# Patient Record
Sex: MALE | Race: WHITE | NOT HISPANIC OR LATINO | Employment: OTHER | URBAN - METROPOLITAN AREA
[De-identification: names, ages, dates, MRNs, and addresses within clinical notes are randomized per-mention and may not be internally consistent; named-entity substitution may affect disease eponyms.]

---

## 2023-12-29 ENCOUNTER — MEDICAL CORRESPONDENCE (OUTPATIENT)
Dept: HEALTH INFORMATION MANAGEMENT | Facility: CLINIC | Age: 68
End: 2023-12-29

## 2024-01-02 ENCOUNTER — TRANSCRIBE ORDERS (OUTPATIENT)
Dept: OTHER | Age: 69
End: 2024-01-02

## 2024-01-02 DIAGNOSIS — R41.840 INATTENTION: ICD-10-CM

## 2024-01-02 DIAGNOSIS — R41.3 MEMORY DEFICITS: Primary | ICD-10-CM

## 2024-01-02 DIAGNOSIS — R45.4 IRRITABILITY AND ANGER: ICD-10-CM

## 2024-02-19 ENCOUNTER — TRANSFERRED RECORDS (OUTPATIENT)
Dept: HEALTH INFORMATION MANAGEMENT | Facility: CLINIC | Age: 69
End: 2024-02-19
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
## 2024-03-10 ENCOUNTER — HEALTH MAINTENANCE LETTER (OUTPATIENT)
Age: 69
End: 2024-03-10

## 2024-03-11 ENCOUNTER — OFFICE VISIT (OUTPATIENT)
Dept: NEUROPSYCHOLOGY | Facility: CLINIC | Age: 69
End: 2024-03-11
Attending: PSYCHOLOGIST
Payer: COMMERCIAL

## 2024-03-11 DIAGNOSIS — F41.9 ANXIETY: Primary | ICD-10-CM

## 2024-03-11 DIAGNOSIS — R41.3 MEMORY DEFICITS: ICD-10-CM

## 2024-03-11 DIAGNOSIS — F32.0 CURRENT MILD EPISODE OF MAJOR DEPRESSIVE DISORDER, UNSPECIFIED WHETHER RECURRENT (H): ICD-10-CM

## 2024-03-11 PROCEDURE — 96138 PSYCL/NRPSYC TECH 1ST: CPT | Performed by: PSYCHOLOGIST

## 2024-03-11 PROCEDURE — 90791 PSYCH DIAGNOSTIC EVALUATION: CPT | Performed by: PSYCHOLOGIST

## 2024-03-11 PROCEDURE — 96133 NRPSYC TST EVAL PHYS/QHP EA: CPT | Performed by: PSYCHOLOGIST

## 2024-03-11 PROCEDURE — 96139 PSYCL/NRPSYC TST TECH EA: CPT | Performed by: PSYCHOLOGIST

## 2024-03-11 PROCEDURE — 96132 NRPSYC TST EVAL PHYS/QHP 1ST: CPT | Performed by: PSYCHOLOGIST
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
## 2024-03-12 NOTE — NURSING NOTE
Pt was seen for neuropsychological evaluation at the request of Dr. Donnie Guerrero for the purposes of diagnostic clarification and treatment planning. 150 minutes of test administration and scoring were provided by this writer. Please see Dr. Iftikhar Luna's report for a full interpretation of the findings.    Humphrey Nevarez MA, CSP

## 2024-03-12 NOTE — PROGRESS NOTES
NAME  Ebenezer Ambrocio (Bill)    MRN  [id number]      [birth year]     AGE  68     SEX  Male     HANDEDNESS Right     EDUCATION 11     KELLY  3/11/24     PROVIDER  CJ     [employer]       STATION  OP            ORIENTATION      Time  -1     Personal Info.     Place  1.5     Presidents             TOMM       Trial 1  50            WAIS-IV       Digit Span RDS 8            WRAT READING   5   SS  108     %ile  70     Grade Equivalence >12.9            WAIS-IV         Raw ScS    Digit Span  24 9    Similarities 27 11           TRAIL MAKING TEST       Time Errors ScS T   A 28 0 10 60   B 57 0 11 65           RBANS   A     Raw ScS/%ile    List Learning 24 7    Story Memory 14 6    Figure Copy 18 10    Line Orientation 19 >75    Picture Naming 10 51-75    Semantic Fluency 13 4    Digit Span  10 10    Coding  48 10    List Recall  1 3-9    List Recognition 18 10-16    Story Recall 7 8    Figure Recall 17 13             Index     Immediate Mem. 81     Visuospat./Constr. 109     Language  85     Attention  100     Delayed Mem. 84     Total Scale 88            COWAT FAS      Raw 23      ScS 6      T 39             CLOCK DRAWING      Command  NORMAL     Copy  NORMAL            STROOP        Raw T     Word 91 54     Color 38 35     C/W 29 48     Intf. 2 60            GDS       Raw  10     Interpretation MILD/MODERATE           GAS        Raw Interpretation    Somatic 9 Mild     Cognitive 11 Severe     Affective 11 Severe     Total 31 Severe

## 2024-03-13 ENCOUNTER — TRANSFERRED RECORDS (OUTPATIENT)
Dept: HEALTH INFORMATION MANAGEMENT | Facility: CLINIC | Age: 69
End: 2024-03-13

## 2024-03-15 DIAGNOSIS — G47.33 OSA (OBSTRUCTIVE SLEEP APNEA): Primary | ICD-10-CM

## 2024-03-22 ENCOUNTER — TELEPHONE (OUTPATIENT)
Dept: FAMILY MEDICINE | Facility: CLINIC | Age: 69
End: 2024-03-22

## 2024-03-22 ENCOUNTER — OFFICE VISIT (OUTPATIENT)
Dept: SLEEP MEDICINE | Facility: CLINIC | Age: 69
End: 2024-03-22
Payer: COMMERCIAL

## 2024-03-22 VITALS
TEMPERATURE: 97.4 F | SYSTOLIC BLOOD PRESSURE: 144 MMHG | RESPIRATION RATE: 20 BRPM | DIASTOLIC BLOOD PRESSURE: 82 MMHG | HEIGHT: 69 IN | OXYGEN SATURATION: 96 % | WEIGHT: 232 LBS | HEART RATE: 60 BPM | BODY MASS INDEX: 34.36 KG/M2

## 2024-03-22 DIAGNOSIS — R06.83 SNORING: Primary | ICD-10-CM

## 2024-03-22 DIAGNOSIS — E66.09 CLASS 1 OBESITY DUE TO EXCESS CALORIES WITH SERIOUS COMORBIDITY AND BODY MASS INDEX (BMI) OF 34.0 TO 34.9 IN ADULT: ICD-10-CM

## 2024-03-22 DIAGNOSIS — E66.811 CLASS 1 OBESITY DUE TO EXCESS CALORIES WITH SERIOUS COMORBIDITY AND BODY MASS INDEX (BMI) OF 34.0 TO 34.9 IN ADULT: ICD-10-CM

## 2024-03-22 DIAGNOSIS — I10 PRIMARY HYPERTENSION: ICD-10-CM

## 2024-03-22 DIAGNOSIS — F51.04 PSYCHOPHYSIOLOGICAL INSOMNIA: ICD-10-CM

## 2024-03-22 PROBLEM — R79.89 LOW TESTOSTERONE LEVEL IN MALE: Status: ACTIVE | Noted: 2023-01-10

## 2024-03-22 PROBLEM — J34.3 NASAL TURBINATE HYPERTROPHY: Status: ACTIVE | Noted: 2019-09-04

## 2024-03-22 PROBLEM — M1A.9XX1 TOPHI: Status: ACTIVE | Noted: 2023-11-06

## 2024-03-22 PROBLEM — N52.01 ERECTILE DYSFUNCTION DUE TO ARTERIAL INSUFFICIENCY: Status: ACTIVE | Noted: 2022-10-24

## 2024-03-22 PROBLEM — K42.0 UMBILICAL HERNIA WITH OBSTRUCTION, WITHOUT GANGRENE: Status: ACTIVE | Noted: 2022-02-14

## 2024-03-22 PROBLEM — G47.30 SLEEP APNEA, UNSPECIFIED TYPE: Status: RESOLVED | Noted: 2024-02-19 | Resolved: 2024-03-22

## 2024-03-22 PROBLEM — E66.9 OBESITY, UNSPECIFIED OBESITY SEVERITY, UNSPECIFIED OBESITY TYPE: Status: ACTIVE | Noted: 2024-03-19

## 2024-03-22 PROBLEM — I49.9 IRREGULAR HEARTBEAT: Status: ACTIVE | Noted: 2024-02-19

## 2024-03-22 PROBLEM — R13.14 PHARYNGOESOPHAGEAL DYSPHAGIA: Status: ACTIVE | Noted: 2021-03-11

## 2024-03-22 PROBLEM — R06.5 CHRONIC MOUTH BREATHING: Status: ACTIVE | Noted: 2019-09-04

## 2024-03-22 PROBLEM — H35.30 MACULAR DEGENERATION: Status: ACTIVE | Noted: 2022-03-04

## 2024-03-22 PROBLEM — G47.30 SLEEP APNEA, UNSPECIFIED TYPE: Status: ACTIVE | Noted: 2024-02-19

## 2024-03-22 PROBLEM — I25.10 CORONARY ARTERY CALCIFICATION: Status: ACTIVE | Noted: 2020-10-23

## 2024-03-22 PROBLEM — E11.65 UNCONTROLLED TYPE 2 DIABETES MELLITUS WITH HYPERGLYCEMIA (H): Status: ACTIVE | Noted: 2019-12-27

## 2024-03-22 PROBLEM — H81.10 BPPV (BENIGN PAROXYSMAL POSITIONAL VERTIGO): Status: ACTIVE | Noted: 2021-01-22

## 2024-03-22 PROBLEM — K22.5 ZENKER'S DIVERTICULUM: Status: ACTIVE | Noted: 2020-02-05

## 2024-03-22 PROBLEM — F43.21 ADJUSTMENT DISORDER WITH DEPRESSED MOOD: Status: ACTIVE | Noted: 2021-10-08

## 2024-03-22 PROCEDURE — 99203 OFFICE O/P NEW LOW 30 MIN: CPT | Performed by: INTERNAL MEDICINE

## 2024-03-22 RX ORDER — DICLOFENAC SODIUM 75 MG/1
75 TABLET, DELAYED RELEASE ORAL 2 TIMES DAILY PRN
COMMUNITY
Start: 2023-06-26

## 2024-03-22 RX ORDER — ASPIRIN 81 MG/1
81 TABLET, CHEWABLE ORAL DAILY
COMMUNITY

## 2024-03-22 RX ORDER — ATORVASTATIN CALCIUM 20 MG/1
20 TABLET, FILM COATED ORAL DAILY
COMMUNITY
Start: 2023-07-05

## 2024-03-22 RX ORDER — KETOCONAZOLE 20 MG/G
CREAM TOPICAL DAILY
COMMUNITY
Start: 2023-09-14

## 2024-03-22 RX ORDER — GLIPIZIDE 10 MG/1
10 TABLET ORAL
COMMUNITY
Start: 2024-02-19 | End: 2024-05-07

## 2024-03-22 RX ORDER — OMEPRAZOLE 40 MG/1
40 CAPSULE, DELAYED RELEASE ORAL DAILY
COMMUNITY
Start: 2023-11-18

## 2024-03-22 RX ORDER — LOSARTAN POTASSIUM 100 MG/1
100 TABLET ORAL DAILY
COMMUNITY
Start: 2023-07-05

## 2024-03-22 RX ORDER — SILDENAFIL 100 MG/1
100 TABLET, FILM COATED ORAL DAILY
COMMUNITY
Start: 2022-11-25

## 2024-03-22 ASSESSMENT — SLEEP AND FATIGUE QUESTIONNAIRES
HOW LIKELY ARE YOU TO NOD OFF OR FALL ASLEEP IN A CAR, WHILE STOPPED FOR A FEW MINUTES IN TRAFFIC: WOULD NEVER DOZE
HOW LIKELY ARE YOU TO NOD OFF OR FALL ASLEEP WHEN YOU ARE A PASSENGER IN A CAR FOR AN HOUR WITHOUT A BREAK: WOULD NEVER DOZE
HOW LIKELY ARE YOU TO NOD OFF OR FALL ASLEEP WHILE WATCHING TV: SLIGHT CHANCE OF DOZING
HOW LIKELY ARE YOU TO NOD OFF OR FALL ASLEEP WHILE SITTING AND TALKING TO SOMEONE: WOULD NEVER DOZE
HOW LIKELY ARE YOU TO NOD OFF OR FALL ASLEEP WHILE SITTING QUIETLY AFTER LUNCH WITHOUT ALCOHOL: WOULD NEVER DOZE
HOW LIKELY ARE YOU TO NOD OFF OR FALL ASLEEP WHILE SITTING INACTIVE IN A PUBLIC PLACE: WOULD NEVER DOZE
HOW LIKELY ARE YOU TO NOD OFF OR FALL ASLEEP WHILE LYING DOWN TO REST IN THE AFTERNOON WHEN CIRCUMSTANCES PERMIT: HIGH CHANCE OF DOZING
HOW LIKELY ARE YOU TO NOD OFF OR FALL ASLEEP WHILE SITTING AND READING: SLIGHT CHANCE OF DOZING

## 2024-03-22 NOTE — PROGRESS NOTES
Outpatient Sleep Medicine Consultation:      Name: Ebenezer Ambrocio MRN# [id number]   Age: 68 year old YOB: 1955     Date of Consultation: March 22, 2024  Consultation is requested by: No referring provider defined for this encounter. No ref. provider found  Primary care provider: Donnie Guerrero       Reason for Sleep Consult:     Ebenezer Ambrocio is sent by No ref. provider found for a sleep consultation regarding snoring.    Patient s Reason for visit  Ebenezer Ambrocio main reason for visit: hard time getting to sleep;possible sleep apnia  Patient states problem(s) started: 20 years ago  Ebenezer Ambrocio's goals for this visit: find out how to get good sleep           Assessment and Plan:     Summary Sleep Diagnoses:  Snoring  Chronic insomnia    Comorbid Diagnoses:  Obesity  Hypertension  Type 2 diabetes      Summary Recommendations:  Reviewed sleep hygiene with regard to his chronic insomnia  Discussed options for diagnosis of sleep apnea in this man with high pretest probability  Orders Placed This Encounter   Procedures    HST-Home Sleep Apnea Test - Noxturnal Returnable           Summary Counseling:    Sleep Testing Reviewed  Obstructive Sleep Apnea Reviewed  Complications of Untreated Sleep Apnea Reviewed  Reviewed sleep hygiene and sleep restriction with regard to chronic insomnia    Medical Decision-making:   Educational materials provided in instructions    Total time spent reviewing medical records, history and physical examination, review of previous testing and interpretation as well as documentation on this date:30    CC: No ref. provider found          History of Present Illness:     Roe is a 68-year-old prior to  who has had difficulty falling asleep and staying asleep for over 20 years.  He is  and now lives with the significant other.  Both have complained of his severe snoring over the years.  He wakes himself gasping at times.  No heartburn though he  is on a PPI.  No morning headaches or edema.  He is on medication for hypertension.  He has nocturia 0-5 times per night.  Was on amitriptyline for insomnia which was not helpful and has been stopped.    Past Sleep Evaluations: None    SLEEP-WAKE SCHEDULE:     Work/School Days: Patient goes to school/work: No   Usually gets into bed at 10:00pm-midnight  Takes patient about an hour to fall asleep  Has trouble falling asleep every night nights per week  Wakes up in the middle of the night 1-6 times times.  Wakes up due to Snorting self awake;Use the bathroom;Anxiety  He has trouble falling back asleep all the time times a week.   It usually takes do not know; sometimes hours to get back to sleep  Patient is usually up at 9-11am  Uses alarm: Yes    Weekends/Non-work Days/All Other Days:  Usually gets into bed at midnight   Takes patient about an hour to fall asleep  Patient is usually up at 9-11am  Uses alarm: Yes    Sleep Need  Patient gets  8hrs sleep on average   Patient thinks he needs about 6hrs sleep    Ebenezer Ambrocio prefers to sleep in this position(s): Side   Patient states they do the following activities in bed:      Naps  Patient takes a purposeful nap 3 days a week times a week and naps are usually 2hrs in duration  He feels better after a nap: Yes  He dozes off unintentionally 0 days per week  Patient has had a driving accident or near-miss due to sleepiness/drowsiness: No      SLEEP DISRUPTIONS:    Breathing/Snoring  Patient snores:Yes  Other people complain about his snoring: Yes  Patient has been told he stops breathing in his sleep:Yes  He has issues with the following: Morning mouth dryness;Getting up to urinate more than once    Movement:  Patient gets pain, discomfort, with an urge to move:  No  It happens when he is resting:     It happens more at night:     Patient has been told he kicks his legs at night:        Behaviors in Sleep:  Ebenezer Ambrocio has experienced the following behaviors  while sleeping:    He has experienced sudden muscle weakness during the day: No      Is there anything else you would like your sleep provider to know:          CAFFEINE AND OTHER SUBSTANCES:    Patient consumes caffeinated beverages per day:  1  Last caffeine use is usually: 3:00pm  List of any prescribed or over the counter stimulants that patient takes:    List of any prescribed or over the counter sleep medication patient takes:    List of previous sleep medications that patient has tried: malitonin gummies,nyquil  Patient drinks alcohol to help them sleep: No  Patient drinks alcohol near bedtime: No    Family History:  Patient has a family member been diagnosed with a sleep disorder: Yes  brother sleepwalks         SCALES:    EPWORTH SLEEPINESS SCALE         3/22/2024    10:21 AM    [place] Sleepiness Scale ( AKIKO Arnold  [id number]<br>ESS - [place]/English - Final version - 21 Nov 07 - [facility] [place].)   Sitting and reading Slight chance of dozing   Watching TV Slight chance of dozing   Sitting, inactive in a public place (e.g. a theatre or a meeting) Would never doze   As a passenger in a car for an hour without a break Would never doze   Lying down to rest in the afternoon when circumstances permit High chance of dozing   Sitting and talking to someone Would never doze   Sitting quietly after a lunch without alcohol Would never doze   In a car, while stopped for a few minutes in traffic Would never doze   [place] Score (MC) 5   [place] Score (Sleep) 5         INSOMNIA SEVERITY INDEX (BLANKA)          3/22/2024     9:52 AM   Insomnia Severity Index (BLANKA)   Difficulty falling asleep 4   Difficulty staying asleep 3   Problems waking up too early 2   How SATISFIED/DISSATISFIED are you with your CURRENT sleep pattern? 4   How NOTICEABLE to others do you think your sleep problem is in terms of impairing the quality of your life? 4   How WORRIED/DISTRESSED are you about your current sleep problem? 3   To what  "extent do you consider your sleep problem to INTERFERE with your daily functioning (e.g. daytime fatigue, mood, ability to function at work/daily chores, concentration, memory, mood, etc.) CURRENTLY? 2   BLANKA Total Score 22       Guidelines for Scoring/Interpretation:  Total score categories:  0-7 = No clinically significant insomnia   8-14 = Subthreshold insomnia   15-21 = Clinical insomnia (moderate severity)  22-28 = Clinical insomnia (severe)  Used via courtesy of www.[employer].va.gov with permission from Sarbjit Sanchez PhD., [facility]      STOP BANG         3/22/2024    10:58 AM   STOP BANG Questionnaire (  2008, the American Society of Anesthesiologists, Inc. [employer]   [facility]: 144/82         GAD7         No data to display                  CAGE-AID        3/22/2024    10:20 AM   CAGE-AID Flowsheet   Have you ever felt you should Cut down on your drinking or drug use? 0   Have people Annoyed you by criticizing your drinking or drug use? 0   Have you ever felt bad or Guilty about your drinking or drug use? 0   Have you ever had a drink or used drugs first thing in the morning to steady your nerves or to get rid of a hangover? (Eye opener) 0   CAGE-AID SCORE 0   Have you ever felt you should Cut down on your drinking or drug use? No   Have people Annoyed you by criticizing your drinking or drug use? No   Have you ever felt bad or Guilty about your drinking or drug use? No   Have you ever had a drink or used drugs first thing in the morning to steady your nerves or to get rid of a hangover? (Eye opener) No   CAGE-AID SCORE 0 (A total score of 2 or greater is considered clinically significant)       CAGE-AID reprinted with permission from the [place] Medical Journal, JOANNA Gray. and AURELIA Rdz, \"Conjoint screening questionnaires for alcohol and drug abuse\" [place] Medical Journal 94: 135-140, 1995.      PATIENT HEALTH QUESTIONNAIRE-9 (PHQ - 9)         No data to display "                Developed by Eric Gibson, Joann Mitchell, Oliver Sahu and colleagues, with an educational rebel from [employer] No permission required to reproduce, translate, display or distribute.        Allergies:    Allergies   Allergen Reactions    Fentanyl GI Disturbance and Nausea and Vomiting       Medications:    Current Outpatient Medications   Medication Sig Dispense Refill    aspirin (ASA) 81 MG chewable tablet Take 81 mg by mouth daily      atorvastatin (LIPITOR) 20 MG tablet Take 20 mg by mouth daily      diclofenac (VOLTAREN) 75 MG EC tablet Take 75 mg by mouth 2 times daily as needed for moderate pain      glipiZIDE (GLUCOTROL) 10 MG tablet Take 10 mg by mouth 2 times daily (before meals)      ketoconazole (NIZORAL) 2 % external cream Apply topically daily      losartan (COZAAR) 100 MG tablet Take 100 mg by mouth daily      metFORMIN (GLUCOPHAGE) 1000 MG tablet Take 1,000 mg by mouth 2 times daily (with meals)      omeprazole (PRILOSEC) 40 MG DR capsule Take 40 mg by mouth daily      sildenafil (VIAGRA) 100 MG tablet Take 100 mg by mouth daily         Problem List:  Patient Active Problem List    Diagnosis Date Noted    Snoring 03/22/2024     Priority: Medium    Class 1 obesity due to excess calories with body mass index (BMI) of 34.0 to 34.9 in adult 03/19/2024     Priority: Medium    Irregular heartbeat 02/19/2024     Priority: Medium    Tophi 11/06/2023     Priority: Medium    Low testosterone level in male 01/10/2023     Priority: Medium    Erectile dysfunction due to arterial insufficiency 10/24/2022     Priority: Medium    Macular degeneration 03/04/2022     Priority: Medium    Umbilical hernia with obstruction, without gangrene 02/14/2022     Priority: Medium    Adjustment disorder with depressed mood 10/08/2021     Priority: Medium    Pharyngoesophageal dysphagia 03/11/2021     Priority: Medium     Formatting of this note might be different from the original.   Added  automatically from request for surgery [id number]      BPPV (benign paroxysmal positional vertigo) 01/22/2021     Priority: Medium    Psychophysiological insomnia 01/21/2021     Priority: Medium    Coronary artery calcification 10/23/2020     Priority: Medium    Zenker's diverticulum 02/05/2020     Priority: Medium     Formatting of this note might be different from the original.   Added automatically from request for surgery [id number]  Formatting of this note might be different from the original.   Added automatically from request for surgery [id number]      Uncontrolled type 2 diabetes mellitus with hyperglycemia (H) 12/27/2019     Priority: Medium    Nasal turbinate hypertrophy 09/04/2019     Priority: Medium     Formatting of this note might be different from the original.   Added automatically from request for surgery [id number]      Chronic mouth breathing 09/04/2019     Priority: Medium     Formatting of this note might be different from the original.   Added automatically from request for surgery [id number]      Primary hypertension 01/29/2016     Priority: Medium    Hyperlipidemia 01/29/2016     Priority: Medium    Hepatitis C virus infection resolved after antiviral drug therapy 03/07/2003     Priority: Medium        Past Medical/Surgical History:  No past medical history on file.  No past surgical history on file.    Social History:  Social History     Socioeconomic History    Marital status:      Spouse name: Not on file    Number of children: Not on file    Years of education: Not on file    Highest education level: Not on file   Occupational History    Occupation: Retired Truckdriver   Tobacco Use    Smoking status: Never    Smokeless tobacco: Never   Vaping Use    Vaping Use: Never used   Substance and Sexual Activity    Alcohol use: Not on file    Drug use: Not on file    Sexual activity: Not on file   Other Topics Concern    Not on file   Social History Narrative    Not on file     Social Determinants of Health  "    Financial Resource Strain: Not on file   Food Insecurity: Not on file   Transportation Needs: Not on file   Physical Activity: Not on file   Stress: Not on file   Social Connections: Not on file   Interpersonal Safety: Not on file   Housing Stability: Not on file       Family History:  Family History   Problem Relation Age of Onset    Leukemia Father     Other - See Comments Brother         REM BD    Sleep Apnea Maternal Grandmother        Review of Systems:  A complete review of systems reviewed by me is negative with the exeption of what has been mentioned in the history of present illness.  In the last TWO WEEKS have you experienced any of the following symptoms?  Fevers: No  Night Sweats: Yes  Weight Gain: No  Pain at Night: No  Double Vision: No  Changes in Vision: No  Difficulty Breathing through Nose: Yes  Sore Throat in Morning: No  Dry Mouth in the Morning: Yes  Shortness of Breath Lying Flat: No  Shortness of Breath With Activity: No  Awakening with Shortness of Breath: No  Increased Cough: No  Heart Racing at Night: No  Swelling in Feet or Legs: No  Diarrhea at Night: No  Heartburn at Night: No  Urinating More than Once at Night: Yes  Losing Control of Urine at Night: No  Joint Pains at Night: No  Headaches in Morning: No  Weakness in Arms or Legs: No  Depressed Mood: Yes  Anxiety: Yes     Physical Examination:  Vitals: BP (!) 144/82   Pulse 60   Temp 97.4  F (36.3  C) (Tympanic)   Resp 20   Ht 1.74 m (5' 8.5\")   Wt 105.2 kg (232 lb)   SpO2 96%   BMI 34.76 kg/m    BMI= Body mass index is 34.76 kg/m .       Healthy-appearing overweight man in no distress  HEENT exam crowded.  Mallampati 3  Neck is thick no adenopathy or thyromegaly  Lungs clear  Cardiac exam regular no murmur no edema  Oriented speech fluent, cognition intact, cranial nerves normal, gait normal  Normal mood and affect           Data: All pertinent previous laboratory data reviewed     none      Crow Ely MD 3/22/2024 "

## 2024-03-22 NOTE — PROGRESS NOTES
"  {PROVIDER CHARTING PREFERENCE:353231}    Subjective   Bill is a 68 year old, presenting for the following health issues:  Consult (New pt here for a sleep consult)    HPI     ***    {ROS Picklists (Optional):008866}      Objective    BP (!) 168/90   Pulse 60   Temp 97.4  F (36.3  C) (Tympanic)   Resp 20   Ht 1.74 m (5' 8.5\")   Wt 105.2 kg (232 lb)   SpO2 96%   BMI 34.76 kg/m    Body mass index is 34.76 kg/m .  Physical Exam   {Exam List (Optional):982567}    {Diagnostic Test Results (Optional):160192}        Signed Electronically by: Crow Ely MD  {Email feedback regarding this note to [email or web address]   :[id number]}  "

## 2024-03-22 NOTE — TELEPHONE ENCOUNTER
Reason for Call:  Appointment Request    Patient requesting this type of appt:  [place] Pt. Plz forward sleep study in [facility] to [facility] for scheduling. Thank you    Requested provider:     Reason patient unable to be scheduled:     When does patient want to be seen/preferred time:     Comments:     Could we send this information to you in [facility] or would you prefer to receive a phone call?:       Call taken on 3/22/2024 at 3:21 PM by Judy Fischer

## 2024-03-29 ENCOUNTER — MYC MEDICAL ADVICE (OUTPATIENT)
Dept: FAMILY MEDICINE | Facility: CLINIC | Age: 69
End: 2024-03-29
Payer: COMMERCIAL

## 2024-03-29 NOTE — TELEPHONE ENCOUNTER
Please change appt with [facility] HST for sleep study set up to 4/8/2024 1:40pm and let him know if he calls.     If scheduling staff not able to change schedule Niecy Howell MA in the sleep clinic [facility] can do.

## 2024-04-08 ENCOUNTER — OFFICE VISIT (OUTPATIENT)
Dept: SLEEP MEDICINE | Facility: CLINIC | Age: 69
End: 2024-04-08
Attending: INTERNAL MEDICINE
Payer: COMMERCIAL

## 2024-04-08 DIAGNOSIS — R06.83 SNORING: ICD-10-CM

## 2024-04-08 PROCEDURE — G0399 HOME SLEEP TEST/TYPE 3 PORTA: HCPCS | Performed by: INTERNAL MEDICINE

## 2024-04-09 ENCOUNTER — DOCUMENTATION ONLY (OUTPATIENT)
Dept: SLEEP MEDICINE | Facility: CLINIC | Age: 69
End: 2024-04-09
Payer: COMMERCIAL

## 2024-04-10 NOTE — PROGRESS NOTES
Pt returned HST device. It was downloaded and forwarded data to the clinical specialist for scoring.      HST POST-STUDY QUESTIONNAIRE    What time did you go to bed?  11PM  How long do you think it took to fall asleep?  30 min  What time did you wake up to start the day?  10:40 am  Did you get up during the night at all?  yes  If you woke up, do you remember approximately what time(s)? Midnight, 2am, 3 am, 7:30 am and 10 am  Did you have any difficulty with the equipment?  No  Did you us any type of treatment with this study?  None  Was the head of the bed elevated? No  Did you sleep in a recliner?  No  Did you stop using CPAP at least 3 days before this test?  NA  Any other information you'd like us to know? At 3am I took a shot of Nyquil to help me get back to sleep. I was awake from midnight until 3 am

## 2024-04-14 ENCOUNTER — TRANSFERRED RECORDS (OUTPATIENT)
Dept: HEALTH INFORMATION MANAGEMENT | Facility: CLINIC | Age: 69
End: 2024-04-14
Payer: COMMERCIAL

## 2024-04-16 NOTE — PROGRESS NOTES
".HOME SLEEP STUDY INTERPRETATION        Patient: Ebenezer Ambrocio  MRN: [id number]  YOB: 1955  Study Date: 4/8/2024  PCP/Referring Provider: Donnie Guerrero;   Ordering Provider: Crow Ely MD         Indications for Home Study: Ebenezer Ambrocio is a 68 year old male with a history of obesity who presents with symptoms suggestive of obstructive sleep apnea.    Estimated body mass index is 34.76 kg/m  as calculated from the following:    Height as of 3/22/24: 1.74 m (5' 8.5\").    Weight as of 3/22/24: 105.2 kg (232 lb).  Total score - Arnold: 5 (3/22/2024 10:21 AM)  Total Score: 7 (3/22/2024 10:22 AM)        Data: A full night home sleep study was performed recording the standard physiologic parameters including body position, movement, sound, nasal pressure, thermal oral airflow, chest and abdominal movements with respiratory inductance plethysmography, and oxygen saturation by pulse oximetry. Pulse rate was estimated by oximetry recording. This study was considered adequate based on > 4 hours of quality oximetry and respiratory recording. As specified by the AASM Manual for the Scoring of Sleep and Associated events, version 2.3, Rule VIII.D 1B, 4% oxygen desaturation scoring for hypopneas is used as a standard of care on all home sleep apnea testing.        Analysis Time:  668 minutes        Respiration:   Sleep Associated Hypoxemia: sustained hypoxemia was not present.  Average oxygen saturation was 90.7%.  Time with saturation less than or equal to 88% was 154 minutes. The lowest oxygen saturation was 69%.   Snoring: Snoring was mild.  Respiratory events: The home study revealed a presence of 217 obstructive apneas and 365 mixed and central apneas. There were 288 hypopneas resulting in a combined apnea/hypopnea index [AHI] of 72.4 events per hour.  AHI was 81.4 per hour supine, 63.1 per hour nonsupine.   Pattern: Excluding events noted above, respiratory rate and pattern was " Normal.      Position: Percent of time spent: supine -15.8%, prone -0%, on left -22%, on right -27%.      Heart Rate: By pulse oximetry normal rate was noted.       Assessment:   Severe obstructive sleep apnea.  Sleep associated hypoxemia was not present.    Recommendations:  Consider auto-CPAP at 5-20 cmH2O or polysomnography with full night PAP titration.  Suggest optimizing sleep hygiene and avoiding sleep deprivation.  Weight management.        Diagnosis Code(s): Obstructive Sleep Apnea G47.33    Electronically signed by: Crow Ely MD, April 16, 2024   Diplomate, American Board of Internal Medicine, Sleep Medicine

## 2024-04-16 NOTE — PROGRESS NOTES
This HSAT was performed using a Noxturnal T3 device which recorded snore, sound, movement activity, body position, nasal pressure, oronasal thermal airflow, pulse, oximetry and both chest and abdominal respiratory effort. HSAT data was restricted to the time patient states they were in bed.     HSAT was scored using 1B 4% hypopnea rule.     AHI: 72.4  Snoring was reported as loud.  Time with SpO2 below 89% was 154.3 minutes.   Overall signal quality was good     Pt will follow up with sleep provider to determine appropriate therapy.     Ordering Provider, Crow Ely MD C. Oyugi, MANDY, RPSGT, RST System Clinical Specialist/ 4/16/2024

## 2024-04-24 ENCOUNTER — TRANSFERRED RECORDS (OUTPATIENT)
Dept: HEALTH INFORMATION MANAGEMENT | Facility: CLINIC | Age: 69
End: 2024-04-24
Payer: COMMERCIAL

## 2024-05-02 ENCOUNTER — TRANSFERRED RECORDS (OUTPATIENT)
Dept: MULTI SPECIALTY CLINIC | Facility: CLINIC | Age: 69
End: 2024-05-02

## 2024-05-02 LAB — RETINOPATHY: NORMAL

## 2024-05-07 ENCOUNTER — OFFICE VISIT (OUTPATIENT)
Dept: FAMILY MEDICINE | Facility: CLINIC | Age: 69
End: 2024-05-07
Payer: COMMERCIAL

## 2024-05-07 VITALS
WEIGHT: 229 LBS | RESPIRATION RATE: 20 BRPM | DIASTOLIC BLOOD PRESSURE: 82 MMHG | BODY MASS INDEX: 33.92 KG/M2 | HEIGHT: 69 IN | OXYGEN SATURATION: 95 % | HEART RATE: 72 BPM | SYSTOLIC BLOOD PRESSURE: 150 MMHG | TEMPERATURE: 96.7 F

## 2024-05-07 DIAGNOSIS — G47.33 OBSTRUCTIVE SLEEP APNEA SYNDROME, SEVERE: Primary | ICD-10-CM

## 2024-05-07 DIAGNOSIS — N40.1 BENIGN PROSTATIC HYPERPLASIA WITH NOCTURIA: ICD-10-CM

## 2024-05-07 DIAGNOSIS — R35.1 BENIGN PROSTATIC HYPERPLASIA WITH NOCTURIA: ICD-10-CM

## 2024-05-07 DIAGNOSIS — E66.09 CLASS 1 OBESITY DUE TO EXCESS CALORIES WITH SERIOUS COMORBIDITY AND BODY MASS INDEX (BMI) OF 34.0 TO 34.9 IN ADULT: ICD-10-CM

## 2024-05-07 DIAGNOSIS — E66.811 CLASS 1 OBESITY DUE TO EXCESS CALORIES WITH SERIOUS COMORBIDITY AND BODY MASS INDEX (BMI) OF 34.0 TO 34.9 IN ADULT: ICD-10-CM

## 2024-05-07 PROCEDURE — 99214 OFFICE O/P EST MOD 30 MIN: CPT | Performed by: INTERNAL MEDICINE

## 2024-05-07 RX ORDER — METHYLPHENIDATE HYDROCHLORIDE 10 MG/1
10 TABLET ORAL DAILY
COMMUNITY
Start: 2024-04-22

## 2024-05-07 RX ORDER — BLOOD-GLUCOSE METER
EACH MISCELLANEOUS
COMMUNITY
Start: 2024-04-08

## 2024-05-07 NOTE — ASSESSMENT & PLAN NOTE
Severe snoring  [place] [address]  HST 4/8/2024 revealing an AHI of 72.4 and oximetry kendra of 69%  Reviewed all of the treatment options for sleep apnea as well as the in lab CPAP titration versus APAP

## 2024-05-07 NOTE — PROGRESS NOTES
Pt CPAP supply order,today's visit notes and HST results faxed to [facility] in [facility] [phone] per pt request.  Raulito Larry CMA

## 2024-05-07 NOTE — ASSESSMENT & PLAN NOTE
Seeing the urologist in 2 weeks  Offered tamsulosin therapy after discussion of potential side effects he declined

## 2024-05-07 NOTE — PROGRESS NOTES
"  Assessment & Plan   Problem List Items Addressed This Visit       Obstructive sleep apnea syndrome, severe - Primary     Severe snoring  [place] [address]  HST 4/8/2024 revealing an AHI of 72.4 and oximetry kendra of 69%  Reviewed all of the treatment options for sleep apnea as well as the in lab CPAP titration versus APAP           Relevant Orders    CPAP Order for DME - ONLY FOR DME (Completed)    PRIMARY CARE FOLLOW-UP SCHEDULING    Class 1 obesity due to excess calories with body mass index (BMI) of 34.0 to 34.9 in adult     Discussed the importance of weight management and offered a referral         Relevant Medications    empagliflozin (JARDIANCE) 10 MG TABS tablet    methylphenidate (RITALIN) 10 MG tablet    Benign prostatic hyperplasia with nocturia     Seeing the urologist in 2 weeks  Offered tamsulosin therapy after discussion of potential side effects he declined                    BMI  Estimated body mass index is 34.31 kg/m  as calculated from the following:    Height as of this encounter: 1.74 m (5' 8.5\").    Weight as of this encounter: 103.9 kg (229 lb).   Weight management plan: Discussed the importance of weight loss and offered referral which was declined    CPAP follow-up in 2 months      Subjective   Roe is a 68 year old, presenting for the following health issues:  Sleep Study (Pt here for Follow-up on HST results from 4/8/24)      5/7/2024    10:56 AM   Additional Questions   Roomed by Raulito SORIA     History of Present Illness       Reason for visit:  Sleep study    He eats 0-1 servings of fruits and vegetables daily.He consumes 0 sweetened beverage(s) daily.He exercises with enough effort to increase his heart rate 9 or less minutes per day.  He exercises with enough effort to increase his heart rate 3 or less days per week.   He is taking medications regularly.     Patient here for follow-up of a home sleep test.  Severe snoring.  Chronic nocturia.  On PPI for acid reflux.  Dry mouth in the morning. " " Reports mild daytime sleepiness.              Review of Systems  Constitutional, HEENT, cardiovascular, pulmonary, gi and gu systems are negative, except as otherwise noted.      Objective    BP (!) 150/82 (BP Location: Right arm, Patient Position: Sitting, Cuff Size: Adult Large)   Pulse 72   Temp (!) 96.7  F (35.9  C) (Tympanic)   Resp 20   Ht 1.74 m (5' 8.5\")   Wt 103.9 kg (229 lb)   SpO2 95%   BMI 34.31 kg/m    Body mass index is 34.31 kg/m .  Physical Exam   Overweight older man in no distress  HEENT exam reveals a crowded oropharynx.  Mallampati 3  Neck thick  Alert and oriented  In good spirits            Signed Electronically by: Crow Ely MD    "

## 2024-05-15 ENCOUNTER — OFFICE VISIT (OUTPATIENT)
Dept: CARDIOLOGY | Facility: CLINIC | Age: 69
End: 2024-05-15
Payer: COMMERCIAL

## 2024-05-15 VITALS
RESPIRATION RATE: 16 BRPM | WEIGHT: 226 LBS | DIASTOLIC BLOOD PRESSURE: 76 MMHG | BODY MASS INDEX: 33.86 KG/M2 | HEART RATE: 71 BPM | SYSTOLIC BLOOD PRESSURE: 134 MMHG

## 2024-05-15 DIAGNOSIS — I49.3 PVC'S (PREMATURE VENTRICULAR CONTRACTIONS): Primary | ICD-10-CM

## 2024-05-15 PROCEDURE — 99204 OFFICE O/P NEW MOD 45 MIN: CPT | Performed by: INTERNAL MEDICINE

## 2024-05-15 PROCEDURE — G2211 COMPLEX E/M VISIT ADD ON: HCPCS | Performed by: INTERNAL MEDICINE

## 2024-05-15 RX ORDER — BUPROPION HYDROCHLORIDE 150 MG/1
1 TABLET ORAL
COMMUNITY
Start: 2024-05-09

## 2024-05-15 NOTE — PROGRESS NOTES
[facility]  Cardiac Electrophysiology  [address]  [place] [address]   Office: [phone]  Fax: [phone]     Patient: Ebenezer Ambrocio   : 1955       CHIEF COMPLAINT/REASON FOR VISIT  Pauses  Premature ventricular contractions      Assessment/Recommendations     Pauses - 2 pauses (9s at 0437, 7s 0848) which appear vagally mediated.  Both pauses associated with sinus slowing, apparent PA prolongation prior to period of sinus bradycardia without AV conduction, followed by return of AV conduction, shortening PA, sinus acceleration.   Good functional capacity.  No cardiogenic syncope. Low concern for significant conduction system disease.  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  13.5% burden by 3/30/2024 Holter monitoring.    We reviewed PVCs and treatment options including observation, medical therapy, and PVC mapping and ablation.  We reviewed PVC mapping and ablation procedures, risks (including groin bleeding, vascular injury, stroke, tamponade, heart block) and recovery expectations.  - TTE  - assuming normal LV function, can plan for expectant management - re-evaluate PVC burden and LV function in 1 year      Follow up: EP follow-up as needed            History of Present Illness   Ebenezer Ambrocio is a 68 year old male with pauses, frequent premature ventricular contractions, BPPV referred by Dr. Sellers for consultation regarding pauses, PVCs.    Mr. Ambrocio notes was noted to have an irregular pulse at visit with his primary care provider 3/2024.  He underwent 48hr Holter monitoring 3/30/2024 showing 13.5% PVCs (2 discrete morphologies), 2 pauses (9s at 0437, 7s 0848).  Both pauses associated with sinus  slowing, apparent WA prolongation prior to period of sinus bradycardia without AV conduction, followed by return of AV conduction, shortening WA, sinus acceleration.      He notes rare isolated palpitations.  He exercises at a gym 3 mornings per week, 3 hours each time, including weights and cardio.  He has undergone a sleep study - he is awatiing CPAP next week.  He has BPPV, he had a fall around 2022 and sustained a number of injuries - he completed vertigo PT.  He denies exertional limitation, chest pain, syncope.  He is getting  6/22/2024.  He recently moved to [place], [place].         Physical Examination  Review of Systems   VITALS: /76 (BP Location: Left arm, Patient Position: Sitting, Cuff Size: Adult Large)   Pulse 71   Resp 16   Wt 102.5 kg (226 lb)   BMI 33.86 kg/m    Wt Readings from Last 3 Encounters:   05/07/24 103.9 kg (229 lb)   03/22/24 105.2 kg (232 lb)     CONSTITUTIONAL: well nourished, comfortable, no distress  EYES:  Conjunctivae pink, sclerae clear.    E/N/T:  Oral mucosa pink  RESPIRATORY:  Respiratory effort is normal  CARDIOVASCULAR:  normal S1 and S2  GASTROINTESTINAL:  Abdomen without masses or tenderness  EXTREMITIES:  No clubbing or cyanosis.    MUSCULOSKELETAL:  Overall grossly normal muscle strength  SKIN:  Overall, skin warm and dry, no lesions.  NEURO/PSYCH:  Oriented x 3 with normal affect.   Constitutional:  No weight loss or loss of appetite    Eyes:  No difficulty with vision, no double vision, no dry eyes  ENT:  No sore throat, difficulty swallowing; changes in hearing or tinnitus  Cardiovascular: As detailed above  Respiratory:  No cough  Musculoskeletal  No joint pain, muscle aches  Neurologic:  No syncope, lightheadedness, fainting spells   Hematologic: No easy bruising, excessive bleeding tendency   Gastrointestinal:  No jaundice, abdominal pain or abdominal bloating  Genitourinary: No changes in urinary habits, no trouble urinating    Psychiatric: No anxiety  "or depression      Medical History  Surgical History   No past medical history on file. No past surgical history on file.      Family History Social History   Family History   Problem Relation Age of Onset    Leukemia Father     Other - See Comments Brother         REM BD    Sleep Apnea Maternal Grandmother         Social History     Tobacco Use    Smoking status: Never    Smokeless tobacco: Never   Vaping Use    Vaping status: Never Used         Medications  Allergies     Current Outpatient Medications:     aspirin (ASA) 81 MG chewable tablet, Take 81 mg by mouth daily, Disp: , Rfl:     atorvastatin (LIPITOR) 20 MG tablet, Take 20 mg by mouth daily, Disp: , Rfl:     Blood Glucose Monitoring Suppl (ACCU-CHEK GUIDE ME) w/Device KIT, , Disp: , Rfl:     diclofenac (VOLTAREN) 75 MG EC tablet, Take 75 mg by mouth 2 times daily as needed for moderate pain, Disp: , Rfl:     empagliflozin (JARDIANCE) 10 MG TABS tablet, , Disp: , Rfl:     ketoconazole (NIZORAL) 2 % external cream, Apply topically daily, Disp: , Rfl:     losartan (COZAAR) 100 MG tablet, Take 100 mg by mouth daily, Disp: , Rfl:     metFORMIN (GLUCOPHAGE) 1000 MG tablet, Take 1,000 mg by mouth 2 times daily (with meals), Disp: , Rfl:     methylphenidate (RITALIN) 10 MG tablet, Take 10 mg by mouth daily, Disp: , Rfl:     omeprazole (PRILOSEC) 40 MG DR capsule, Take 40 mg by mouth daily, Disp: , Rfl:     sildenafil (VIAGRA) 100 MG tablet, Take 100 mg by mouth daily, Disp: , Rfl:      Allergies   Allergen Reactions    Fentanyl GI Disturbance and Nausea and Vomiting          Lab Results    Chemistry CBC Cardiac Enzymes/BNP/TSH/INR   No results for input(s): \"NA\", \"POTASSIUM\", \"CHLORIDE\", \"CO2\", \"GLC\", \"BUN\", \"CR\", \"GFRESTIMATED\", \"PAULINA\" in the last 96813 hours.    Invalid input(s): \"GRFESTBLACK\"  No results for input(s): \"CR\" in the last 84441 hours.       No results for input(s): \"WBC\", \"HGB\", \"HCT\", \"MCV\", \"PLT\" in the last 75317 hours.  No results for input(s): " "\"HGB\" in the last 50247 hours. No results for input(s): \"TROPONINI\" in the last 84849 hours.  No results for input(s): \"BNP\", \"NTBNPI\", \"NTBNP\" in the last 58557 hours.  No results for input(s): \"TSH\" in the last 66931 hours.  No results for input(s): \"INR\" in the last 63419 hours.      Data Review    ECGs (tracings independently reviewed)  2/19/2024 - SR, PVCs of LBLIA (r I, R II>III, V4 transition)    48hr Holter monitoring 3/30/2024 (independently reviewed)  The patient was enrolled for a total of 2 days.  Average heart rate  was 67, minimum reportedly was 7 per minute, fastest was 101 beats per  minute  Ventricular ectopy consisted of 26,596 beats, measuring 13.5%.  There  were 16 PACs  There was a 9-second pause at 4:37 a.m.  There was another one at  8:52 a.m. measuring 7 seconds.  There were no sustained ventricular  rhythm abnormalities.    CONCLUSION:  1.  Sinus rhythm.  2.  Moderate PVC burden.  3.  Occasional significant bradycardic episodes.   Notes:  2 PVC morphologies - 60.1%, 39.9%  2 pauses as noted above             Cc: Alfred Sellesr MD, Donnie Guerrero MD Amila Dilusha William, MD  5/15/2024  7:59 AM      "

## 2024-05-15 NOTE — PATIENT INSTRUCTIONS
[facility]  Cardiac Electrophysiology  [address]  [place] [address]   Office: [phone]  Fax: [phone]       Thank you for seeing us in clinic today - it is a pleasure to be a part of your care team.  Below is a summary of our plan from today's visit.      You were noted to have two heart rhythm pauses on your 3/2024 Holter monitor - both of these appear to be vagally mediated and do not require pacemaker implantation.    You were also noted to have frequent premature ventricular contractions (PVCs).  You are not having any symptoms from you PVCs.  We will order an echocardiogram to evaluate your heart function.  If your heart function is normal, ongoing observation of your PVCs would be reasonable.        We will plan for the following:  - proceed with CPAP for recently diagnosed LITA  - seek ER evaluation and contact us in case of loss of consciousness episodes  - echocardiogram (heart ultrasound) to evaluate heart function    Please do not hesitate to be in touch with our office at [phone] with any questions that may arise.      Thank you for trusting us with your care,    Tamia Sol MD  Clinical Cardiac Electrophysiology  [facility]  [address]  [place] [address]   Office: [phone]  Fax: [phone]

## 2024-05-15 NOTE — LETTER
5/15/2024    Donnie Guerrero MD  [address]  [facility] [id number]    RE: Ebenezer Ambrocio       Dear Colleague,     I had the pleasure of seeing Ebenezer Ambrocio in the [facility].     [facility]  Cardiac Electrophysiology  [address]  [place] [address]   Office: [phone]  Fax: [phone]     Patient: Ebenezer Ambrocio   : 1955       CHIEF COMPLAINT/REASON FOR VISIT  Pauses  Premature ventricular contractions      Assessment/Recommendations     Pauses - 2 pauses (9s at 0437, 7s 0848) which appear vagally mediated.  Both pauses associated with sinus slowing, apparent GA prolongation prior to period of sinus bradycardia without AV conduction, followed by return of AV conduction, shortening GA, sinus acceleration.   Good functional capacity.  No cardiogenic syncope. Low concern for significant conduction system disease.  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  13.5% burden by 3/30/2024 Holter monitoring.    We reviewed PVCs and treatment options including observation, medical therapy, and PVC mapping and ablation.  We reviewed PVC mapping and ablation procedures, risks (including groin bleeding, vascular injury, stroke, tamponade, heart block) and recovery expectations.  - TTE  - assuming normal LV function, can plan for expectant management - re-evaluate PVC burden and LV function in 1 year      Follow up: EP follow-up as needed            History of Present Illness   Ebenezer Ambrocio is a 68 year old male with pauses, frequent premature ventricular contractions, BPPV referred by Dr. Sellers for consultation regarding pauses, PVCs.    Mr. Ambrocio notes was noted to have an irregular  pulse at visit with his primary care provider 3/2024.  He underwent 48hr Holter monitoring 3/30/2024 showing 13.5% PVCs (2 discrete morphologies), 2 pauses (9s at 0437, 7s 0848).  Both pauses associated with sinus slowing, apparent AZ prolongation prior to period of sinus bradycardia without AV conduction, followed by return of AV conduction, shortening AZ, sinus acceleration.      He notes rare isolated palpitations.  He exercises at a gym 3 mornings per week, 3 hours each time, including weights and cardio.  He has undergone a sleep study - he is awatiing CPAP next week.  He has BPPV, he had a fall around 2022 and sustained a number of injuries - he completed vertigo PT.  He denies exertional limitation, chest pain, syncope.  He is getting  6/22/2024.  He recently moved to [place], [place].         Physical Examination  Review of Systems   VITALS: /76 (BP Location: Left arm, Patient Position: Sitting, Cuff Size: Adult Large)   Pulse 71   Resp 16   Wt 102.5 kg (226 lb)   BMI 33.86 kg/m    Wt Readings from Last 3 Encounters:   05/07/24 103.9 kg (229 lb)   03/22/24 105.2 kg (232 lb)     CONSTITUTIONAL: well nourished, comfortable, no distress  EYES:  Conjunctivae pink, sclerae clear.    E/N/T:  Oral mucosa pink  RESPIRATORY:  Respiratory effort is normal  CARDIOVASCULAR:  normal S1 and S2  GASTROINTESTINAL:  Abdomen without masses or tenderness  EXTREMITIES:  No clubbing or cyanosis.    MUSCULOSKELETAL:  Overall grossly normal muscle strength  SKIN:  Overall, skin warm and dry, no lesions.  NEURO/PSYCH:  Oriented x 3 with normal affect.   Constitutional:  No weight loss or loss of appetite    Eyes:  No difficulty with vision, no double vision, no dry eyes  ENT:  No sore throat, difficulty swallowing; changes in hearing or tinnitus  Cardiovascular: As detailed above  Respiratory:  No cough  Musculoskeletal  No joint pain, muscle aches  Neurologic:  No syncope, lightheadedness, fainting spells  "  Hematologic: No easy bruising, excessive bleeding tendency   Gastrointestinal:  No jaundice, abdominal pain or abdominal bloating  Genitourinary: No changes in urinary habits, no trouble urinating    Psychiatric: No anxiety or depression      Medical History  Surgical History   No past medical history on file. No past surgical history on file.      Family History Social History   Family History   Problem Relation Age of Onset    Leukemia Father     Other - See Comments Brother         REM BD    Sleep Apnea Maternal Grandmother         Social History     Tobacco Use    Smoking status: Never    Smokeless tobacco: Never   Vaping Use    Vaping status: Never Used         Medications  Allergies     Current Outpatient Medications:     aspirin (ASA) 81 MG chewable tablet, Take 81 mg by mouth daily, Disp: , Rfl:     atorvastatin (LIPITOR) 20 MG tablet, Take 20 mg by mouth daily, Disp: , Rfl:     Blood Glucose Monitoring Suppl (ACCU-CHEK GUIDE ME) w/Device KIT, , Disp: , Rfl:     diclofenac (VOLTAREN) 75 MG EC tablet, Take 75 mg by mouth 2 times daily as needed for moderate pain, Disp: , Rfl:     empagliflozin (JARDIANCE) 10 MG TABS tablet, , Disp: , Rfl:     ketoconazole (NIZORAL) 2 % external cream, Apply topically daily, Disp: , Rfl:     losartan (COZAAR) 100 MG tablet, Take 100 mg by mouth daily, Disp: , Rfl:     metFORMIN (GLUCOPHAGE) 1000 MG tablet, Take 1,000 mg by mouth 2 times daily (with meals), Disp: , Rfl:     methylphenidate (RITALIN) 10 MG tablet, Take 10 mg by mouth daily, Disp: , Rfl:     omeprazole (PRILOSEC) 40 MG DR capsule, Take 40 mg by mouth daily, Disp: , Rfl:     sildenafil (VIAGRA) 100 MG tablet, Take 100 mg by mouth daily, Disp: , Rfl:      Allergies   Allergen Reactions    Fentanyl GI Disturbance and Nausea and Vomiting          Lab Results    Chemistry CBC Cardiac Enzymes/BNP/TSH/INR   No results for input(s): \"NA\", \"POTASSIUM\", \"CHLORIDE\", \"CO2\", \"GLC\", \"BUN\", \"CR\", \"GFRESTIMATED\", \"PAULINA\" in the " "last 42715 hours.    Invalid input(s): \"GRFESTBLACK\"  No results for input(s): \"CR\" in the last 16102 hours.       No results for input(s): \"WBC\", \"HGB\", \"HCT\", \"MCV\", \"PLT\" in the last 43137 hours.  No results for input(s): \"HGB\" in the last 98048 hours. No results for input(s): \"TROPONINI\" in the last 65370 hours.  No results for input(s): \"BNP\", \"NTBNPI\", \"NTBNP\" in the last 52915 hours.  No results for input(s): \"TSH\" in the last 32282 hours.  No results for input(s): \"INR\" in the last 50971 hours.      Data Review    ECGs (tracings independently reviewed)  2/19/2024 - SR, PVCs of LBLIA (r I, R II>III, V4 transition)    48hr Holter monitoring 3/30/2024 (independently reviewed)  The patient was enrolled for a total of 2 days.  Average heart rate  was 67, minimum reportedly was 7 per minute, fastest was 101 beats per  minute  Ventricular ectopy consisted of 26,596 beats, measuring 13.5%.  There  were 16 PACs  There was a 9-second pause at 4:37 a.m.  There was another one at  8:52 a.m. measuring 7 seconds.  There were no sustained ventricular  rhythm abnormalities.    CONCLUSION:  1.  Sinus rhythm.  2.  Moderate PVC burden.  3.  Occasional significant bradycardic episodes.   Notes:  2 PVC morphologies - 60.1%, 39.9%  2 pauses as noted above             Cc: Alfred Sellers MD, Donnie Guerrero MD Amila Dilusha William, MD  5/15/2024  7:59 AM        Thank you for allowing me to participate in the care of your patient.      Sincerely,     Tamia Sol MD     [facility]  cc:   Juli White MD  No address on file      "

## 2024-05-19 ENCOUNTER — HEALTH MAINTENANCE LETTER (OUTPATIENT)
Age: 69
End: 2024-05-19

## 2024-05-23 ENCOUNTER — ANCILLARY PROCEDURE (OUTPATIENT)
Dept: CARDIOLOGY | Facility: CLINIC | Age: 69
End: 2024-05-23
Attending: INTERNAL MEDICINE
Payer: COMMERCIAL

## 2024-05-23 DIAGNOSIS — I49.3 PVC'S (PREMATURE VENTRICULAR CONTRACTIONS): ICD-10-CM

## 2024-05-23 LAB — LVEF ECHO: NORMAL

## 2024-05-23 PROCEDURE — 93306 TTE W/DOPPLER COMPLETE: CPT | Performed by: INTERNAL MEDICINE

## 2024-05-28 DIAGNOSIS — I49.3 PVC'S (PREMATURE VENTRICULAR CONTRACTIONS): Primary | ICD-10-CM

## 2024-07-29 ENCOUNTER — OFFICE VISIT (OUTPATIENT)
Dept: FAMILY MEDICINE | Facility: CLINIC | Age: 69
End: 2024-07-29
Attending: INTERNAL MEDICINE
Payer: COMMERCIAL

## 2024-07-29 VITALS
WEIGHT: 219 LBS | DIASTOLIC BLOOD PRESSURE: 92 MMHG | RESPIRATION RATE: 20 BRPM | HEIGHT: 69 IN | SYSTOLIC BLOOD PRESSURE: 138 MMHG | TEMPERATURE: 96.5 F | BODY MASS INDEX: 32.44 KG/M2 | OXYGEN SATURATION: 98 % | HEART RATE: 72 BPM

## 2024-07-29 DIAGNOSIS — G47.33 OBSTRUCTIVE SLEEP APNEA SYNDROME, SEVERE: Primary | ICD-10-CM

## 2024-07-29 PROCEDURE — 99213 OFFICE O/P EST LOW 20 MIN: CPT | Performed by: INTERNAL MEDICINE

## 2024-07-29 RX ORDER — TAMSULOSIN HYDROCHLORIDE 0.4 MG/1
0.4 CAPSULE ORAL DAILY
COMMUNITY
Start: 2024-06-05

## 2024-07-29 RX ORDER — CETIRIZINE HYDROCHLORIDE 10 MG/1
10 TABLET ORAL DAILY
COMMUNITY
Start: 2024-07-01

## 2024-07-29 NOTE — PROGRESS NOTES
"  Assessment & Plan   Problem List Items Addressed This Visit       Obstructive sleep apnea syndrome, severe - Primary     Severe snoring  [place] 5  HST 4/8/2024 revealing an AHI of 72.4 and oximetry kendra of 69%  Patient is using CPAP every night and is very pleased with that.  No daytime sleepiness or nocturia.  Await compliance report and will add an addendum when it is available.  Encouraged weight loss             Relevant Orders    PRIMARY CARE FOLLOW-UP SCHEDULING          CPAP Follow-up in 1 year.      Sara Au is a 68 year old, presenting for the following health issues:  CPAP Follow Up (Pt here for a CPAP Follow-up )        7/29/2024     3:49 PM   Additional Questions   Roomed by Raulito SORIA     Via the Health Maintenance questionnaire, the patient has reported the following services have been completed -Eye Exam: [facility], [place], [place] 2024-05-02, this information has been sent to the abstraction team.  History of Present Illness       Reason for visit:  Sleep disorder/CPAP discussion    He eats 2-3 servings of fruits and vegetables daily.He consumes 0 sweetened beverage(s) daily.He exercises with enough effort to increase his heart rate 60 or more minutes per day.  He exercises with enough effort to increase his heart rate 3 or less days per week.   He is taking medications regularly.     Patient is very pleased with his CPAP.  Reports marked improvement in daytime sleepiness.  No longer has nocturia.  No headache or heartburn.  No chest pain or dyspnea.  Tells me he is lost 7 pounds encouraged weight loss.  Chronically patient takes him about 45 minutes to fall asleep and this has not changed.              Review of Systems  Constitutional, HEENT, cardiovascular, pulmonary, gi and gu systems are negative, except as otherwise noted.      Objective    BP (!) 138/92   Pulse 72   Temp (!) 96.5  F (35.8  C) (Tympanic)   Resp 20   Ht 1.74 m (5' 8.5\")   Wt 99.3 kg (219 lb)   SpO2 " 98%   BMI 32.81 kg/m    Body mass index is 32.81 kg/m .  Physical Exam   Healthy-appearing man in no distress  Alert and oriented  In good spirits              Signed Electronically by: Crow Ely MD

## 2024-07-29 NOTE — ASSESSMENT & PLAN NOTE
Severe snoring  [place] 5  HST 4/8/2024 revealing an AHI of 72.4 and oximetry kendra of 69%  Patient is using CPAP every night and is very pleased with that.  No daytime sleepiness or nocturia.  Await compliance report and will add an addendum when it is available.  Encouraged weight loss

## 2024-10-06 ENCOUNTER — HEALTH MAINTENANCE LETTER (OUTPATIENT)
Age: 69
End: 2024-10-06

## 2024-12-03 ENCOUNTER — MEDICAL CORRESPONDENCE (OUTPATIENT)
Dept: HEALTH INFORMATION MANAGEMENT | Facility: CLINIC | Age: 69
End: 2024-12-03
Payer: COMMERCIAL

## 2025-01-19 ENCOUNTER — HEALTH MAINTENANCE LETTER (OUTPATIENT)
Age: 70
End: 2025-01-19

## 2025-04-27 ENCOUNTER — HEALTH MAINTENANCE LETTER (OUTPATIENT)
Age: 70
End: 2025-04-27

## 2025-05-28 ENCOUNTER — ORDERS ONLY (AUTO-RELEASED) (OUTPATIENT)
Dept: CARDIOLOGY | Facility: CLINIC | Age: 70
End: 2025-05-28
Payer: COMMERCIAL

## 2025-05-28 DIAGNOSIS — I49.3 PVC'S (PREMATURE VENTRICULAR CONTRACTIONS): ICD-10-CM

## 2025-06-26 LAB — CV ZIO PRELIM RESULTS: NORMAL

## 2025-06-30 ENCOUNTER — PATIENT OUTREACH (OUTPATIENT)
Dept: CARE COORDINATION | Facility: CLINIC | Age: 70
End: 2025-06-30
Payer: COMMERCIAL

## 2025-06-30 ENCOUNTER — RESULTS FOLLOW-UP (OUTPATIENT)
Dept: CARDIOLOGY | Facility: CLINIC | Age: 70
End: 2025-06-30

## 2025-06-30 DIAGNOSIS — I48.91 ATRIAL FIBRILLATION (H): Primary | ICD-10-CM

## 2025-06-30 DIAGNOSIS — I49.3 PVC'S (PREMATURE VENTRICULAR CONTRACTIONS): ICD-10-CM

## 2025-07-22 ENCOUNTER — MYC MEDICAL ADVICE (OUTPATIENT)
Dept: FAMILY MEDICINE | Facility: CLINIC | Age: 70
End: 2025-07-22
Payer: COMMERCIAL

## 2025-07-22 NOTE — TELEPHONE ENCOUNTER
Patient Quality Outreach    Patient is due for the following:   Diabetes -  A1C, LDL (Fasting), Eye Exam, Diabetic Follow-Up Visit, and Foot Exam  Physical Annual Wellness Visit  Fasting labs    Action(s) Taken:   No follow up needed at this time.  Pt is no longer established here as Primary    Type of outreach:    Chart review performed, no outreach needed.    Questions for provider review:    None         Susy Hernandez LPN  Chart routed to None.

## 2025-07-28 DIAGNOSIS — I48.91 ATRIAL FIBRILLATION (H): ICD-10-CM

## 2025-08-10 ENCOUNTER — HEALTH MAINTENANCE LETTER (OUTPATIENT)
Age: 70
End: 2025-08-10

## 2025-08-22 ENCOUNTER — HOSPITAL ENCOUNTER (OUTPATIENT)
Dept: CARDIOLOGY | Facility: HOSPITAL | Age: 70
Discharge: HOME | End: 2025-08-22
Attending: INTERNAL MEDICINE | Admitting: INTERNAL MEDICINE
Payer: COMMERCIAL

## 2025-08-22 DIAGNOSIS — I48.91 ATRIAL FIBRILLATION (H): ICD-10-CM

## 2025-08-22 LAB — LVEF ECHO: NORMAL

## 2025-08-22 PROCEDURE — 93306 TTE W/DOPPLER COMPLETE: CPT

## 2025-08-22 PROCEDURE — 93306 TTE W/DOPPLER COMPLETE: CPT | Mod: 26 | Performed by: INTERNAL MEDICINE

## 2025-09-03 PROBLEM — I48.0 PAROXYSMAL ATRIAL FIBRILLATION (H): Status: ACTIVE | Noted: 2025-09-03

## 2025-09-04 ENCOUNTER — OFFICE VISIT (OUTPATIENT)
Dept: CARDIOLOGY | Facility: CLINIC | Age: 70
End: 2025-09-04
Attending: INTERNAL MEDICINE
Payer: COMMERCIAL

## 2025-09-04 VITALS
HEART RATE: 54 BPM | WEIGHT: 212 LBS | RESPIRATION RATE: 18 BRPM | BODY MASS INDEX: 31.77 KG/M2 | DIASTOLIC BLOOD PRESSURE: 69 MMHG | SYSTOLIC BLOOD PRESSURE: 148 MMHG

## 2025-09-04 DIAGNOSIS — I49.3 PVC'S (PREMATURE VENTRICULAR CONTRACTIONS): Primary | ICD-10-CM

## 2025-09-04 DIAGNOSIS — I48.0 PAROXYSMAL ATRIAL FIBRILLATION (H): ICD-10-CM

## 2025-09-04 RX ORDER — VORTIOXETINE 10 MG/1
10 TABLET, FILM COATED ORAL DAILY
COMMUNITY
Start: 2025-02-04

## 2025-09-04 RX ORDER — OMEGA-3/DHA/EPA/FISH OIL 60 MG-90MG
1000 CAPSULE ORAL DAILY
COMMUNITY

## 2025-09-04 RX ORDER — DESONIDE 0.5 MG/G
CREAM TOPICAL PRN
COMMUNITY
Start: 2025-04-20

## 2025-09-04 RX ORDER — SEMAGLUTIDE 0.68 MG/ML
0.5 INJECTION, SOLUTION SUBCUTANEOUS
COMMUNITY
Start: 2025-02-13 | End: 2025-09-26

## 2025-09-04 RX ORDER — OMEPRAZOLE 20 MG/1
20 CAPSULE, DELAYED RELEASE ORAL DAILY
COMMUNITY
Start: 2025-07-24